# Patient Record
Sex: FEMALE | Race: WHITE | NOT HISPANIC OR LATINO | Employment: FULL TIME | URBAN - METROPOLITAN AREA
[De-identification: names, ages, dates, MRNs, and addresses within clinical notes are randomized per-mention and may not be internally consistent; named-entity substitution may affect disease eponyms.]

---

## 2017-05-10 ENCOUNTER — HISTORICAL (OUTPATIENT)
Dept: ADMINISTRATIVE | Facility: HOSPITAL | Age: 37
End: 2017-05-10

## 2017-05-10 LAB
ABS NEUT (OLG): 2.75 X10(3)/MCL (ref 2.1–9.2)
BASOPHILS # BLD AUTO: 0 X10(3)/MCL (ref 0–0.2)
BASOPHILS NFR BLD AUTO: 0 %
EOSINOPHIL # BLD AUTO: 0 X10(3)/MCL (ref 0–0.9)
EOSINOPHIL NFR BLD AUTO: 0 %
ERYTHROCYTE [DISTWIDTH] IN BLOOD BY AUTOMATED COUNT: 13.4 % (ref 11.5–17)
HBV SURFACE AG SERPL QL IA: NEGATIVE
HCT VFR BLD AUTO: 36.5 % (ref 37–47)
HGB BLD-MCNC: 11.9 GM/DL (ref 12–16)
HIV 1+2 AB+HIV1 P24 AG SERPL QL IA: NEGATIVE
LYMPHOCYTES # BLD AUTO: 2.6 X10(3)/MCL (ref 0.6–4.6)
LYMPHOCYTES NFR BLD AUTO: 45 %
MCH RBC QN AUTO: 30.7 PG (ref 27–31)
MCHC RBC AUTO-ENTMCNC: 32.6 GM/DL (ref 33–36)
MCV RBC AUTO: 94.1 FL (ref 80–94)
MONOCYTES # BLD AUTO: 0.3 X10(3)/MCL (ref 0.1–1.3)
MONOCYTES NFR BLD AUTO: 5 %
NEUTROPHILS # BLD AUTO: 2.75 X10(3)/MCL (ref 1.4–7.9)
NEUTROPHILS NFR BLD AUTO: 49 %
PLATELET # BLD AUTO: 149 X10(3)/MCL (ref 130–400)
PMV BLD AUTO: 11.2 FL (ref 9.4–12.4)
RBC # BLD AUTO: 3.88 X10(6)/MCL (ref 4.2–5.4)
RPR SER QL: NORMAL
WBC # SPEC AUTO: 5.6 X10(3)/MCL (ref 4.5–11.5)

## 2017-05-12 LAB — FINAL CULTURE: NO GROWTH

## 2017-10-03 ENCOUNTER — HISTORICAL (OUTPATIENT)
Dept: ADMINISTRATIVE | Facility: HOSPITAL | Age: 37
End: 2017-10-03

## 2017-10-03 LAB
ERYTHROCYTE [DISTWIDTH] IN BLOOD BY AUTOMATED COUNT: 13.8 % (ref 11.5–17)
GLUCOSE 1H P 100 G GLC PO SERPL-MCNC: 125 MG/DL (ref 100–180)
HCT VFR BLD AUTO: 35.4 % (ref 37–47)
HGB BLD-MCNC: 11.6 GM/DL (ref 12–16)
MCH RBC QN AUTO: 30.9 PG (ref 27–31)
MCHC RBC AUTO-ENTMCNC: 32.8 GM/DL (ref 33–36)
MCV RBC AUTO: 94.1 FL (ref 80–94)
PLATELET # BLD AUTO: 122 X10(3)/MCL (ref 130–400)
PMV BLD AUTO: 10.8 FL (ref 9.4–12.4)
PRODUCT READY: NORMAL
RBC # BLD AUTO: 3.76 X10(6)/MCL (ref 4.2–5.4)
WBC # SPEC AUTO: 6.9 X10(3)/MCL (ref 4.5–11.5)

## 2017-11-16 ENCOUNTER — HISTORICAL (OUTPATIENT)
Dept: LAB | Facility: HOSPITAL | Age: 37
End: 2017-11-16

## 2017-11-18 LAB — FINAL CULTURE: NORMAL

## 2022-04-10 ENCOUNTER — HISTORICAL (OUTPATIENT)
Dept: ADMINISTRATIVE | Facility: HOSPITAL | Age: 42
End: 2022-04-10

## 2022-04-26 VITALS
SYSTOLIC BLOOD PRESSURE: 113 MMHG | BODY MASS INDEX: 34.04 KG/M2 | DIASTOLIC BLOOD PRESSURE: 77 MMHG | WEIGHT: 147.06 LBS | OXYGEN SATURATION: 99 % | HEIGHT: 55 IN

## 2023-01-10 ENCOUNTER — HOSPITAL ENCOUNTER (OUTPATIENT)
Dept: RADIOLOGY | Facility: HOSPITAL | Age: 43
Discharge: HOME | End: 2023-01-10
Attending: STUDENT IN AN ORGANIZED HEALTH CARE EDUCATION/TRAINING PROGRAM
Payer: COMMERCIAL

## 2023-01-10 DIAGNOSIS — Z12.31 ENCOUNTER FOR SCREENING MAMMOGRAM FOR BREAST CANCER: ICD-10-CM

## 2023-01-10 PROCEDURE — 77063 BREAST TOMOSYNTHESIS BI: CPT | Mod: 26,,, | Performed by: RADIOLOGY

## 2023-01-10 PROCEDURE — 77067 MAMMO DIGITAL SCREENING BILAT WITH TOMO: ICD-10-PCS | Mod: 26,,, | Performed by: RADIOLOGY

## 2023-01-10 PROCEDURE — 77067 SCR MAMMO BI INCL CAD: CPT | Mod: 26,,, | Performed by: RADIOLOGY

## 2023-01-10 PROCEDURE — 77063 MAMMO DIGITAL SCREENING BILAT WITH TOMO: ICD-10-PCS | Mod: 26,,, | Performed by: RADIOLOGY

## 2023-01-10 PROCEDURE — 77067 SCR MAMMO BI INCL CAD: CPT | Mod: TC

## 2023-01-19 ENCOUNTER — HOSPITAL ENCOUNTER (OUTPATIENT)
Dept: RADIOLOGY | Facility: HOSPITAL | Age: 43
Discharge: HOME | End: 2023-01-19
Attending: STUDENT IN AN ORGANIZED HEALTH CARE EDUCATION/TRAINING PROGRAM
Payer: COMMERCIAL

## 2023-01-19 DIAGNOSIS — R92.8 ABNORMAL MAMMOGRAM: ICD-10-CM

## 2023-01-19 PROCEDURE — 77062 BREAST TOMOSYNTHESIS BI: CPT | Mod: 26,,, | Performed by: RADIOLOGY

## 2023-01-19 PROCEDURE — 77062 MAMMO DIGITAL DIAGNOSTIC BILAT WITH TOMO: ICD-10-PCS | Mod: 26,,, | Performed by: RADIOLOGY

## 2023-01-19 PROCEDURE — 77062 BREAST TOMOSYNTHESIS BI: CPT | Mod: TC

## 2023-01-19 PROCEDURE — 77066 MAMMO DIGITAL DIAGNOSTIC BILAT WITH TOMO: ICD-10-PCS | Mod: 26,,, | Performed by: RADIOLOGY

## 2023-01-19 PROCEDURE — 76642 ULTRASOUND BREAST LIMITED: CPT | Mod: 26,50,, | Performed by: RADIOLOGY

## 2023-01-19 PROCEDURE — 77066 DX MAMMO INCL CAD BI: CPT | Mod: 26,,, | Performed by: RADIOLOGY

## 2023-01-19 PROCEDURE — 76642 US BREAST BILATERAL LIMITED: ICD-10-PCS | Mod: 26,50,, | Performed by: RADIOLOGY

## 2023-01-19 PROCEDURE — 76642 ULTRASOUND BREAST LIMITED: CPT | Mod: TC,50

## 2023-02-09 ENCOUNTER — HOSPITAL ENCOUNTER (OUTPATIENT)
Dept: RADIOLOGY | Facility: HOSPITAL | Age: 43
Discharge: HOME | End: 2023-02-09
Attending: OBSTETRICS & GYNECOLOGY
Payer: COMMERCIAL

## 2023-02-09 ENCOUNTER — HOSPITAL ENCOUNTER (OUTPATIENT)
Dept: RADIOLOGY | Facility: HOSPITAL | Age: 43
Discharge: HOME | End: 2023-02-09
Attending: STUDENT IN AN ORGANIZED HEALTH CARE EDUCATION/TRAINING PROGRAM
Payer: COMMERCIAL

## 2023-02-09 DIAGNOSIS — R92.1 BREAST CALCIFICATIONS: ICD-10-CM

## 2023-02-09 DIAGNOSIS — R92.8 ABNORMAL MAMMOGRAM: Primary | ICD-10-CM

## 2023-02-09 PROCEDURE — 19082 PR BX BRST, EA ADD'L LESION, STEREOTACTIC GUIDANCE: ICD-10-PCS | Mod: RT,,, | Performed by: RADIOLOGY

## 2023-02-09 PROCEDURE — 27201044 MAMMO BREAST BIOPSY WITH IMAGING EA ADD SITE

## 2023-02-09 PROCEDURE — 19083 BX BREAST 1ST LESION US IMAG: CPT | Mod: 59,RT,, | Performed by: RADIOLOGY

## 2023-02-09 PROCEDURE — 77061 MAMMO DIGITAL DIAGNOSTIC RIGHT WITH TOMO: ICD-10-PCS | Mod: 26,RT,, | Performed by: RADIOLOGY

## 2023-02-09 PROCEDURE — 27201044 MAMMO BREAST STEREOTACTIC BIOPSY 1ST SITE COMPLETE

## 2023-02-09 PROCEDURE — 19081 MAMMO BREAST STEREOTACTIC BIOPSY 1ST SITE COMPLETE: ICD-10-PCS | Mod: RT,,, | Performed by: RADIOLOGY

## 2023-02-09 PROCEDURE — 19081 BX BREAST 1ST LESION STRTCTC: CPT | Mod: RT,,, | Performed by: RADIOLOGY

## 2023-02-09 PROCEDURE — 19083 BX BREAST 1ST LESION US IMAG: CPT | Mod: RT

## 2023-02-09 PROCEDURE — 19082 BX BREAST ADD LESION STRTCTC: CPT | Mod: RT,,, | Performed by: RADIOLOGY

## 2023-02-09 PROCEDURE — 77061 BREAST TOMOSYNTHESIS UNI: CPT | Mod: TC,RT

## 2023-02-09 PROCEDURE — 77065 DX MAMMO INCL CAD UNI: CPT | Mod: 26,RT,, | Performed by: RADIOLOGY

## 2023-02-09 PROCEDURE — 77061 BREAST TOMOSYNTHESIS UNI: CPT | Mod: 26,RT,, | Performed by: RADIOLOGY

## 2023-02-09 PROCEDURE — 19083 PR BX BRST, 1ST LESION, US GUIDANCE: ICD-10-PCS | Mod: 59,RT,, | Performed by: RADIOLOGY

## 2023-02-09 PROCEDURE — 77065 MAMMO DIGITAL DIAGNOSTIC RIGHT WITH TOMO: ICD-10-PCS | Mod: 26,RT,, | Performed by: RADIOLOGY

## 2023-02-09 NOTE — PROGRESS NOTES
Specimen #1 right breast 9:00 4cmfn mass  Specimen #2 right breast upper outer quadrant mid-depth amorphous calcifications    Left breast lateral mid-depth asymmetry

## 2023-02-15 DIAGNOSIS — Z91.89 AT HIGH RISK FOR BREAST CANCER: Primary | ICD-10-CM

## 2023-02-17 LAB — PSYCHE PATHOLOGY RESULT: NORMAL

## 2023-03-29 ENCOUNTER — HOSPITAL ENCOUNTER (EMERGENCY)
Facility: HOSPITAL | Age: 43
Discharge: HOME | End: 2023-03-29
Attending: EMERGENCY MEDICINE
Payer: COMMERCIAL

## 2023-03-29 VITALS
BODY MASS INDEX: 28 KG/M2 | HEIGHT: 63 IN | HEART RATE: 88 BPM | WEIGHT: 158 LBS | RESPIRATION RATE: 18 BRPM | SYSTOLIC BLOOD PRESSURE: 106 MMHG | DIASTOLIC BLOOD PRESSURE: 69 MMHG | TEMPERATURE: 98 F | OXYGEN SATURATION: 99 %

## 2023-03-29 DIAGNOSIS — R10.9 LEFT FLANK PAIN: Primary | ICD-10-CM

## 2023-03-29 DIAGNOSIS — S39.012A BACK STRAIN, INITIAL ENCOUNTER: ICD-10-CM

## 2023-03-29 LAB
ALBUMIN SERPL-MCNC: 4 G/DL (ref 3.5–5)
ALBUMIN/GLOB SERPL: 1.4 RATIO (ref 1.1–2)
ALP SERPL-CCNC: 52 UNIT/L (ref 40–150)
ALT SERPL-CCNC: 9 UNIT/L (ref 0–55)
APPEARANCE UR: CLEAR
AST SERPL-CCNC: 12 UNIT/L (ref 5–34)
B-HCG SERPL QL: NEGATIVE
BACTERIA #/AREA URNS AUTO: ABNORMAL /HPF
BASOPHILS # BLD AUTO: 0.02 X10(3)/MCL (ref 0–0.2)
BASOPHILS NFR BLD AUTO: 0.3 %
BILIRUB UR QL STRIP.AUTO: NEGATIVE MG/DL
BILIRUBIN DIRECT+TOT PNL SERPL-MCNC: 0.3 MG/DL
BUN SERPL-MCNC: 10.8 MG/DL (ref 7–18.7)
CALCIUM SERPL-MCNC: 9.1 MG/DL (ref 8.4–10.2)
CHLORIDE SERPL-SCNC: 108 MMOL/L (ref 98–107)
CO2 SERPL-SCNC: 26 MMOL/L (ref 22–29)
COLOR UR AUTO: YELLOW
CREAT SERPL-MCNC: 1.12 MG/DL (ref 0.55–1.02)
EOSINOPHIL # BLD AUTO: 0.11 X10(3)/MCL (ref 0–0.9)
EOSINOPHIL NFR BLD AUTO: 1.5 %
ERYTHROCYTE [DISTWIDTH] IN BLOOD BY AUTOMATED COUNT: 12.3 % (ref 11.5–17)
GFR SERPLBLD CREATININE-BSD FMLA CKD-EPI: >60 MLS/MIN/1.73/M2
GLOBULIN SER-MCNC: 2.8 GM/DL (ref 2.4–3.5)
GLUCOSE SERPL-MCNC: 99 MG/DL (ref 74–100)
GLUCOSE UR QL STRIP.AUTO: NEGATIVE MG/DL
HCT VFR BLD AUTO: 42 % (ref 37–47)
HGB BLD-MCNC: 14.1 G/DL (ref 12–16)
IMM GRANULOCYTES # BLD AUTO: 0.03 X10(3)/MCL (ref 0–0.04)
IMM GRANULOCYTES NFR BLD AUTO: 0.4 %
KETONES UR QL STRIP.AUTO: NEGATIVE MG/DL
LEUKOCYTE ESTERASE UR QL STRIP.AUTO: NEGATIVE UNIT/L
LIPASE SERPL-CCNC: 38 U/L
LYMPHOCYTES # BLD AUTO: 2.34 X10(3)/MCL (ref 0.6–4.6)
LYMPHOCYTES NFR BLD AUTO: 32.8 %
MCH RBC QN AUTO: 31.1 PG (ref 27–31)
MCHC RBC AUTO-ENTMCNC: 33.6 G/DL (ref 33–36)
MCV RBC AUTO: 92.5 FL (ref 80–94)
MONOCYTES # BLD AUTO: 0.42 X10(3)/MCL (ref 0.1–1.3)
MONOCYTES NFR BLD AUTO: 5.9 %
NEUTROPHILS # BLD AUTO: 4.21 X10(3)/MCL (ref 2.1–9.2)
NEUTROPHILS NFR BLD AUTO: 59.1 %
NITRITE UR QL STRIP.AUTO: NEGATIVE
NRBC BLD AUTO-RTO: 0 %
PH UR STRIP.AUTO: 5 [PH]
PLATELET # BLD AUTO: 160 X10(3)/MCL (ref 130–400)
PMV BLD AUTO: 11.9 FL (ref 7.4–10.4)
POTASSIUM SERPL-SCNC: 4 MMOL/L (ref 3.5–5.1)
PROT SERPL-MCNC: 6.8 GM/DL (ref 6.4–8.3)
PROT UR QL STRIP.AUTO: NEGATIVE MG/DL
RBC # BLD AUTO: 4.54 X10(6)/MCL (ref 4.2–5.4)
RBC #/AREA URNS AUTO: <5 /HPF
RBC UR QL AUTO: NEGATIVE UNIT/L
SODIUM SERPL-SCNC: 140 MMOL/L (ref 136–145)
SP GR UR STRIP.AUTO: 1.02 (ref 1–1.03)
SQUAMOUS #/AREA URNS AUTO: ABNORMAL /HPF
UROBILINOGEN UR STRIP-ACNC: 0.2 MG/DL
WBC # SPEC AUTO: 7.1 X10(3)/MCL (ref 4.5–11.5)
WBC #/AREA URNS AUTO: <5 /HPF
YEAST URNS QL MICRO: ABNORMAL /HPF

## 2023-03-29 PROCEDURE — 81025 URINE PREGNANCY TEST: CPT | Performed by: STUDENT IN AN ORGANIZED HEALTH CARE EDUCATION/TRAINING PROGRAM

## 2023-03-29 PROCEDURE — 63600175 PHARM REV CODE 636 W HCPCS: Performed by: EMERGENCY MEDICINE

## 2023-03-29 PROCEDURE — 85025 COMPLETE CBC W/AUTO DIFF WBC: CPT | Performed by: STUDENT IN AN ORGANIZED HEALTH CARE EDUCATION/TRAINING PROGRAM

## 2023-03-29 PROCEDURE — 99285 EMERGENCY DEPT VISIT HI MDM: CPT | Mod: 25

## 2023-03-29 PROCEDURE — 81001 URINALYSIS AUTO W/SCOPE: CPT | Performed by: STUDENT IN AN ORGANIZED HEALTH CARE EDUCATION/TRAINING PROGRAM

## 2023-03-29 PROCEDURE — 80053 COMPREHEN METABOLIC PANEL: CPT | Performed by: STUDENT IN AN ORGANIZED HEALTH CARE EDUCATION/TRAINING PROGRAM

## 2023-03-29 PROCEDURE — 96372 THER/PROPH/DIAG INJ SC/IM: CPT | Performed by: EMERGENCY MEDICINE

## 2023-03-29 PROCEDURE — 83690 ASSAY OF LIPASE: CPT | Performed by: STUDENT IN AN ORGANIZED HEALTH CARE EDUCATION/TRAINING PROGRAM

## 2023-03-29 RX ORDER — KETOROLAC TROMETHAMINE 30 MG/ML
30 INJECTION, SOLUTION INTRAMUSCULAR; INTRAVENOUS
Status: COMPLETED | OUTPATIENT
Start: 2023-03-29 | End: 2023-03-29

## 2023-03-29 RX ORDER — DICLOFENAC SODIUM 75 MG/1
75 TABLET, DELAYED RELEASE ORAL 2 TIMES DAILY
Qty: 30 TABLET | Refills: 0 | Status: SHIPPED | OUTPATIENT
Start: 2023-03-29

## 2023-03-29 RX ORDER — METHOCARBAMOL 750 MG/1
1500 TABLET, FILM COATED ORAL 3 TIMES DAILY
Qty: 42 TABLET | Refills: 0 | Status: SHIPPED | OUTPATIENT
Start: 2023-03-29 | End: 2023-04-05

## 2023-03-29 RX ADMIN — KETOROLAC TROMETHAMINE 30 MG: 30 INJECTION, SOLUTION INTRAMUSCULAR; INTRAVENOUS at 06:03

## 2023-03-29 NOTE — DISCHARGE INSTRUCTIONS
Follow-up with primary care physician within 1-2 weeks if not back to normal     Return to the ED if worsening pain or new symptoms such as fever, vomiting, blood in urine or stool, pain radiating into the leg, numbness or weakness, etc.

## 2023-03-29 NOTE — ED PROVIDER NOTES
"Encounter Date: 3/29/2023    SCRIBE #1 NOTE: I, Herman Stark, am scribing for, and in the presence of,  Dr. Sampson. I have scribed the following portions of the note - Other sections scribed: HPI, ROS, Physical Exam, MDM, Attending.     History     Chief Complaint   Patient presents with    Flank Pain     Bilat flank pain since Friday. Seen @ urgent care Monday and dx'd w/ UTI. Rx'd Cipro and pain has worsened accompanied by LUQ/LLQ abd pain. Denies painful urination     41 y/o CF presents to ED for back pain worsening since onset 5 days ago.  Pt says the pain radiates to her L flank and is worse when sitting or lying down.  She did not do any new activities prior to onset of the pain.  Pt was seen at walk in clinic 2 days ago and given antibiotics for "kidney infection."  She came here today because her pain continued to worsen.  She dneies fever, dysuria, hematuria, nausea, vomiting, diarrhea, blood in stool, cough, congestion or rhinorrhea.    The history is provided by the patient.   Flank Pain  This is a new problem. Episode onset: 5 days ago. The problem occurs constantly. The problem has been gradually worsening. Pertinent negatives include no chest pain, no abdominal pain, no headaches and no shortness of breath. Exacerbated by: sitting/lying down.   Review of patient's allergies indicates:  No Known Allergies  History reviewed. No pertinent past medical history.  History reviewed. No pertinent surgical history.  History reviewed. No pertinent family history.     Review of Systems   Constitutional:  Negative for fever.   HENT:  Negative for congestion, rhinorrhea and sore throat.    Eyes:  Negative for visual disturbance.   Respiratory:  Negative for cough and shortness of breath.    Cardiovascular:  Negative for chest pain.   Gastrointestinal:  Negative for abdominal pain, blood in stool, constipation, diarrhea, nausea and vomiting.   Genitourinary:  Positive for flank pain. Negative for dysuria and " hematuria.   Musculoskeletal:  Positive for back pain.   Skin:  Negative for rash.   Neurological:  Negative for syncope and headaches.   All other systems reviewed and are negative.    Physical Exam     Initial Vitals [03/29/23 0141]   BP Pulse Resp Temp SpO2   131/78 88 16 98.1 °F (36.7 °C) 99 %      MAP       --         Physical Exam    Nursing note and vitals reviewed.  Constitutional: No distress.   HENT:   Head: Normocephalic and atraumatic.   Right Ear: Tympanic membrane normal.   Left Ear: Tympanic membrane normal.   Mouth/Throat: Oropharynx is clear and moist.   Eyes: Conjunctivae and EOM are normal. Pupils are equal, round, and reactive to light.   Neck: Trachea normal. Neck supple. Carotid bruit is not present. No JVD present.   Normal range of motion.  Cardiovascular:  Normal rate and regular rhythm.           No murmur heard.  Pulmonary/Chest: Breath sounds normal. No respiratory distress. She exhibits no tenderness.   Abdominal: Abdomen is soft. Bowel sounds are normal. She exhibits no distension. There is no abdominal tenderness.   Musculoskeletal:         General: Tenderness (mild L mid lumbar paraspinous) present. Normal range of motion.      Cervical back: Normal range of motion and neck supple.      Lumbar back: Normal. Normal range of motion.      Comments: No midline or bony tenderness     Neurological: She is alert and oriented to person, place, and time. She has normal strength. No cranial nerve deficit or sensory deficit.   Slight discomfort of back with L straight leg raise   Psychiatric: She has a normal mood and affect.       ED Course   Procedures  Labs Reviewed   COMPREHENSIVE METABOLIC PANEL - Abnormal; Notable for the following components:       Result Value    Chloride 108 (*)     Creatinine 1.12 (*)     All other components within normal limits   CBC WITH DIFFERENTIAL - Abnormal; Notable for the following components:    MCH 31.1 (*)     MPV 11.9 (*)     All other components within  normal limits   URINALYSIS, MICROSCOPIC - Abnormal; Notable for the following components:    Squamous Epithelial Cells, UA 5-10 (*)     Yeast, UA Occasional (*)     All other components within normal limits   URINALYSIS, REFLEX TO URINE CULTURE - Normal   LIPASE - Normal   PREGNANCY TEST, URINE RAPID - Normal   CBC W/ AUTO DIFFERENTIAL    Narrative:     The following orders were created for panel order CBC auto differential.  Procedure                               Abnormality         Status                     ---------                               -----------         ------                     CBC with Differential[[id number]]        Abnormal            Final result                 Please view results for these tests on the individual orders.          Imaging Results              CT Abdomen Pelvis  Without Contrast (Final result)  Result time 03/29/23 06:47:00      Final result by Nadeem Skaggs MD (03/29/23 06:47:00)                   Impression:      Trace pelvic ascites is within physiologic limits.  Otherwise, no acute process identified.      Electronically signed by: Nadeem Skaggs  Date:    03/29/2023  Time:    06:47               Narrative:    EXAMINATION:  CT ABDOMEN PELVIS WITHOUT CONTRAST    CLINICAL HISTORY:  Flank pain, kidney stone suspected;    TECHNIQUE:  CT imaging of the abdomen and pelvis without intravenous contrast. Axial, coronal and sagittal reformatted images reviewed. Dose length product is 601 mGycm. Automatic exposure control, adjustment of mA/kV or iterative reconstruction technique used to limit radiation dose.    COMPARISON:  No relevant comparison studies available at the time of dictation.    FINDINGS:  Assessment of the visceral organs and vasculature is limited by the lack of IV contrast.    Liver/biliary: No concerning hepatic findings. No radiodense gallstone or biliary dilatation appreciated.    Pancreas: Normal.    Spleen: Normal.    Adrenals: Normal.    Genitourinary: No  hydronephrosis or defined urinary stone. Bladder under distended with no definitive abnormality.    Stomach/bowel: No bowel obstruction. Normal appendix. No discernible bowel inflammation.    Lymph nodes and peritoneum: No pathologically enlarged lymph node identified with noncontrast technique. Trace pelvic ascites.    Vasculature: Normal abdominal aortic caliber.    Abdominal wall: Normal.    Lung bases: No consolidation or pleural effusion.    Bones: No acute osseous findings.                                       Medications   ketorolac injection 30 mg (30 mg Intramuscular Given 3/29/23 0618)              Scribe Attestation:   Scribe #1: I performed the above scribed service and the documentation accurately describes the services I performed. I attest to the accuracy of the note.    Attending Attestation:           Physician Attestation for Scribe:  Physician Attestation Statement for Scribe #1: I, reviewed documentation, as scribed by Herman Stark in my presence, and it is both accurate and complete.         Medical Decision Making  Differential diagnoses include, but are not limited to: back strain, herniated disc, muscle spasm, pancreatitis, UTI, pyelonephritis, kidney stone, mass    Amount and/or Complexity of Data Reviewed  Labs: ordered. Decision-making details documented in ED Course.  Radiology: ordered. Decision-making details documented in ED Course.    Risk  Prescription drug management.  Risk Details: Patient with no concerning symptoms, specifically no concerning neurologic symptoms or findings.  She never really experienced any signs of a UTI or infectious etiology, with the exception of some mid left paraspinal lumbar pain.  She has no bony or midline pain, tenderness, swelling or redness on exam  Abdominal exam is benign, does have some reproducible pain with sitting straight leg raise on the left, consistent with musculoskeletal etiology.  Had a long conversation with the patient about her  laboratory tests findings, as well as CT findings.  I did express that at this point in time I am not sure if this is just spasms/strain versus problems because of potential herniated disc, bone spur causing radiculopathy.  She does not have radicular symptoms at this time, so will treat conservatively, with the understanding that she needs to follow up with PCP if not improving for possible physical therapy and then MRI fails to improve.  She also understands the need to return if worsening pain, and/or development of new symptoms, even in the context of a normal workup today.  Patient appears very reliable to return if worsening            ED Course as of 03/29/23 0726   Wed Mar 29, 2023   0716 Patient feeling better after Toradol [MP]      ED Course User Index  [MP] Norm Sampson MD                 Clinical Impression:   Final diagnoses:  [R10.9] Left flank pain (Primary)  [S39.012A] Back strain, initial encounter        ED Disposition Condition    Discharge Stable          ED Prescriptions       Medication Sig Dispense Start Date End Date Auth. Provider    methocarbamoL (ROBAXIN) 750 MG Tab Take 2 tablets (1,500 mg total) by mouth 3 (three) times daily. As needed for pain or spasms for 7 days 42 tablet 3/29/2023 4/5/2023 Norm Sampson MD    diclofenac (VOLTAREN) 75 MG EC tablet Take 1 tablet (75 mg total) by mouth 2 (two) times daily. 30 tablet 3/29/2023 -- Norm Sampson MD          Follow-up Information       Follow up With Specialties Details Why Contact Info    [facility] - Emergency Dept Emergency Medicine Go to  If symptoms worsen, As needed [id number] [facility] [id number] [phone]             Norm Sampson MD  03/29/23 8993

## 2023-04-18 ENCOUNTER — OFFICE VISIT (OUTPATIENT)
Dept: SURGERY | Facility: CLINIC | Age: 43
End: 2023-04-18
Payer: COMMERCIAL

## 2023-04-18 VITALS
OXYGEN SATURATION: 100 % | HEART RATE: 84 BPM | HEIGHT: 63 IN | TEMPERATURE: 98 F | BODY MASS INDEX: 28.77 KG/M2 | DIASTOLIC BLOOD PRESSURE: 76 MMHG | WEIGHT: 162.38 LBS | SYSTOLIC BLOOD PRESSURE: 107 MMHG | RESPIRATION RATE: 18 BRPM

## 2023-04-18 DIAGNOSIS — Z91.89 AT HIGH RISK FOR BREAST CANCER: Primary | ICD-10-CM

## 2023-04-18 DIAGNOSIS — Z80.3 FAMILY HISTORY OF BREAST CANCER: ICD-10-CM

## 2023-04-18 DIAGNOSIS — Z12.31 SCREENING MAMMOGRAM FOR BREAST CANCER: ICD-10-CM

## 2023-04-18 PROCEDURE — 99999 PR PBB SHADOW E&M-EST. PATIENT-LVL V: CPT | Mod: PBBFAC,,,

## 2023-04-18 PROCEDURE — 99999 PR PBB SHADOW E&M-EST. PATIENT-LVL V: ICD-10-PCS | Mod: PBBFAC,,,

## 2023-04-18 PROCEDURE — 99205 PR OFFICE/OUTPT VISIT, NEW, LEVL V, 60-74 MIN: ICD-10-PCS | Mod: S$GLB,,, | Performed by: NURSE PRACTITIONER

## 2023-04-18 PROCEDURE — 99205 OFFICE O/P NEW HI 60 MIN: CPT | Mod: S$GLB,,, | Performed by: NURSE PRACTITIONER

## 2023-04-18 NOTE — PROGRESS NOTES
[facility] - Breast [place] [facility]  Breast Surgical Oncology  New Patient Office Visit - H&P      Referring Provider:  Dr. Vitaly Renae     PCP: Networked reference to record PCT      Care Team: Patient Care Team:  Vitaly Renae MD as PCP - General (Obstetrics and Gynecology)    Chief Complaint:   Chief Complaint   Patient presents with    High Risk Breast Assessment     New High Risk Patient; TC score 37.6; Patient had Breast Biopsy done ; Patient c/o discomfort in left breast from Biopsy.      Subjective:      History of Present Illness:  Rajesh Navarro  is a pleasant female patient who initially presents on  2023 at 42 y.o. years old for evaluation and assessment of risk for breast cancer based on  the Tyrer - Cuzick Breast Cancer Risk Model (> 20% indicates elevated lifetime risk). Her lifetime risk is calculated to be TC V8 44.5%. Kathy 5 year 2.8%. Mother diagnosed with Breast CA at age 66. Genetic testing for mother returned negative. No paternal family history of cancer.     Patient continues to breast feed every 2-3 days, mostly with left breast. Patient is a . [facility]. Denies any breast issues including rashes, redness, pain, swelling, nipple discharge, or new lumps/masses. Performs self breast exams monthly.    Imagin23 Screening MG Incomplete.    23 BL Diagnostic MG with US: Breast Heterogeneously dense. Asymmetry in the lateral left breast, middle depth, measuring up to 40 mm is suspicious.  Left breast tomosynthesis guided core needle biopsy is recommended. Amorphous calcifications in a regional distribution spanning 24 mm in the upper-outer quadrant of the right breast, middle to posterior depth, are suspicious.  Right breast stereotactic guided core needle biopsy is recommended. Oval, hypoechoic mass with indistinct margins measuring up to 6 mm in the 9:00 right breast, 4 cm from the nipple, is suspicious.  Right  breast ultrasound-guided core needle biopsy is recommended.      02/1/23 Tomosynthesis/stereotactic guided biopsy of the asymmetry in the lateral left breast, middle depth, with placement of a clip was successful with no apparent post procedure complications.  Pathology indicates focal sclerosing adenosis. Pathology results are benign and are concordant with the imaging findings.      Pathology:   02/01/23 1. RIGHT BREAST AT 9 O'CLOCK 4 CMFN, BIOPSIES:   FRAGMENT OF REACTIVE LYMPH NODE. NO NEOPLASM.   2. RIGHT BREAST, UPPER OUTER QUADRANT MID DEPTH AMORPHOUS CALCIFICATIONS,   BIOPSIES:     A) FIBROCYSTIC CHANGE WITH CALCIFICATION.     B) FIBROADENOMATOID CHANGE.     C) EPITHELIAL HYPERPLASIA OF THE USUAL TYPE, MILD.     D) FOCAL PSEUDOANGIOMATOUS STROMAL HYPERPLASIA.     E) FOCAL SCLEROSING ADENOSIS.   3. LEFT BREAST, LATERAL MID DEPTH ASYMMETRY, BIOPSIES:     FOCAL SCLEROSING ADENOSIS. NO ATYPIA.     OB / GYN History   Menarche Onset:14  Menopause: Menopause: premenopausal  Hormonal birth control (duration): 4years  Pregnancies: 3  Age at first child birth: 25  Child births: 3  Hysterectomy/Oophorectomy: NA  HRT: no    Family History of Cancer (& age at diagnosis):  Family History   Problem Relation Age of Onset    Breast cancer Mother 67    Hypertension Mother     Thyroid disease Mother     Hypertension Father     Breast cancer Maternal Grandmother 55    Heart failure Maternal Grandfather     Breast cancer Maternal Aunt 67    Rashes / Skin problems Maternal Aunt     Pancreatic cancer Maternal Aunt 71    Diabetes Maternal Aunt     Stroke Maternal Aunt     Heart failure Paternal Aunt     Diabetes Paternal Uncle         Lifestyle:  Height and Weight: 5'3'' & 162 lbs  BMI: Body mass index is 28.77 kg/m².     Other:  # of breast biopsies (when and pathology results): 3  MG breast density: No breast composition recorded.   Prior thoracic RT: none  Genetic testing: Mother tested negative, No paternal family history of  [facility] ordered genetic testing.   Ashkenazi [employer] descent: No    Other History:     Past Medical History:   Diagnosis Date    Anemia     When i was 13 or so    Unspecified viral hepatitis B without hepatic coma         Past Surgical History:   Procedure Laterality Date    BREAST BIOPSY          Social History     Socioeconomic History    Marital status:    Tobacco Use    Smoking status: Former     Packs/day: 1.00     Years: 10.00     Pack years: 10.00     Types: Cigarettes, Vaping with nicotine    Smokeless tobacco: Never    Tobacco comments:     Quit since 2014. Smoking off and on   Substance and Sexual Activity    Alcohol use: Yes     Alcohol/week: 1.0 standard drink     Types: 1 Glasses of wine per week    Drug use: Never    Sexual activity: Yes     Partners: Male     Birth control/protection: None        There is no immunization history on file for this patient.     Medications/Allergies:     Current Outpatient Medications:     diclofenac (VOLTAREN) 75 MG EC tablet, Take 1 tablet (75 mg total) by mouth 2 (two) times daily. (Patient not taking: Reported on 4/18/2023), Disp: 30 tablet, Rfl: 0    Review of patient's allergies indicates:  No Known Allergies     Review of Systems:    Pertinent positives documented in history.     Objective/Physical Exam     Vitals:    04/18/23 0911   BP: 107/76   Pulse: 84   Resp: 18   Temp: 98.2 °F (36.8 °C)     General: The patient is awake, alert and oriented times three. The patient is well nourished and in no acute distress.  Neck: There is no evidence of palpable cervical, supraclavicular or axillary adenopathy. The neck is supple. The thyroid is not enlarged.  Musculoskeletal: The patient has a normal range of motion of her bilateral upper extremities.  Chest: Examination of the chest wall fails to reveal any obvious abnormalities. Nonlabored breathing, symmetric expansion.  Breast:  Right: Examination of right breast fails to reveal any dominant masses or areas of  significant focal nodularity. The nipple is everted without evidence of discharge. There is no skin dimpling with movement of the pectoralis. There are no significant skin changes overlying the breast.   Left: Examination of the left breast fails to reveal any dominant masses or areas of significant focal nodularity. The nipple is everted without evidence of discharge. There is no skin dimpling with movement of the pectoralis. There are no significant skin changes overlying the breast.  Abdomen: The abdomen is soft, flat, nontender and nondistended.  Integumentary: no rashes or skin lesions present  Neurologic: cranial nerves intact, no signs of peripheral neurological deficit, motor/sensory function intact     Assessment and Plan     There is no problem list on file for this patient.    Rajesh was seen today for high risk breast assessment.    Diagnoses and all orders for this visit:    At high risk for breast cancer  -     Ambulatory referral/consult to Breast Surgery  -     MRI Breast w/wo Contrast, w/CAD, Bilateral; Future  -     Mammo Digital Screening Bilat; Future    Family history of breast cancer  -     MRI Breast w/wo Contrast, w/CAD, Bilateral; Future  -     Mammo Digital Screening Bilat; Future    Screening mammogram for breast cancer  -     Mammo Digital Screening Bilat; Future      --------------------------------------------------------------------------------------------------------------  After the initial clinical evaluation nearly 30 minutes were on counseling the patients regarding the options for management. Risk reduction strategies were discussed.     1. Lifestyle factors: As with other types of cancer, studies continue to show that various lifestyle factors may contribute to the development of breast cancer.     Weight: Recent studies have shown that postmenopausal women who are overweight or obese have an increased risk of breast cancer. These women also have a higher risk of having the  "cancer come back after treatment.     Physical activity: Decreased physical activity is associated with an increased risk of developing breast cancer and a higher risk of having the cancer come back after treatment. Regular physical activity may protect against breast cancer by helping women maintain a healthy body weight, lowering hormone levels, or causing changes in a womens metabolism or immune factors.     Alcohol: Current research suggests that having more than 1 to 2 alcoholic drinks, including beer, wine, and spirits, per day raises the risk of breast cancer, as well as the risk of having the cancer come back after treatment.     Food: There is no reliable research that confirms that eating or avoiding specific foods reduces the risk of developing breast cancer or having the cancer come back after treatment. However, eating more fruits and vegetables and fewer animal fats is linked with many health benefits.     2. Prevention:  Surgery to lower cancer risk: For women with BRCA1 or BRCA2 genetic mutations, which substantially increase the risk of breast cancer, preventive removal of the breasts may be considered. The procedure, called a prophylactic mastectomy, appears to reduce the risk of developing breast cancer by at least 95%. Women with these mutations should also consider the preventive removal of the ovaries and fallopian tubes, called a prophylactic salpingo-oophorectomy. This procedure can reduce the risk of developing ovarian cancer, as well as breast cancer, by stopping the ovaries from making estrogen.      Drugs to lower cancer risk (Chemoprevention): Women who have a higher than usual risk of developing breast cancer may consider certain drugs that may help prevent breast cancer. This approach may also be called "chemoprevention." For breast cancer, this is the use of hormone-blocking drugs to reduce cancer risk. The drugs, tamoxifen (Soltamox) and raloxifene (Evista), are approved by the [place] " Food and Drug Administration (FDA) to lower breast cancer risk. These drugs are called selective estrogen receptor modulators (SERMs) and are not chemotherapy. A SERM is a medication that blocks estrogen receptors in some tissues and not others. Both women who have gone through menopause and those who have not may take tamoxifen. Raloxifene is only approved for women who have gone through menopause. Each drug also has different side effects.     Aromatase inhibitors (AIs) have also been shown to lower breast cancer risk. AIs are a type of hormone-blocking treatment that reduces the amount of estrogen in a woman's body by stopping tissues and organs other than the ovaries from producing estrogen. They can only be used by women who have gone through menopause. However, no AIs have been approved by the FDA for lowering breast cancer risk in women who do not have the disease.     3. Surveillance: Women at greater than 20 percent average lifetime risk of invasive breast cancer based mainly on family history     Clinical Breast exam: Every 6-12 months starting at age found to be at increased risk by risk model     Mammogram: Every year starting 10 years younger than the youngest breast cancer case in the family (but not before age 30)     Breast MRI: Every year starting 10 years younger than the youngest breast cancer case in the family (but not before age 25). If you have a first-degree relative with a BRCA1/2 gene mutation, youre encouraged to get genetic counseling and/or testing before getting MRI as part of screening (for those who do not wish to have genetic testing, MRI is recommended). Breast MRI in combination with mammography is better than mammography alone at finding breast cancer in certain women at higher than average risk.    --------------------------------------------------------------------------------------------------------------    PLAN:    1. Lifestyle - Healthy lifestyle guidelines were reviewed.  She was encouraged to engage in regular exercise, maintain a healthy body weight, and avoid excessive alcohol consumption. Healthy nutritional guidelines were also discussed. Self-breast examination was reviewed with the patient in detail and she was encouraged to perform this on a monthly basis.    2. Surveillance - She desires undergoing high risk screening with annual screening mammograms and breast MRIs. In the absence of significant clinical findings in the interval, I recommend MRI mid July 2023 followed by annual MG screening mid Jan 2024. If patient unable to afford MRI we will add BL US to MG.     3. Prevention - We had a brief discussion/education about indications for preventative mastectomy or chemoprevention.  These methods are not recommended to her at this time. Kathy 5 year < 3%    4. Genetics - Mother tested negative. No paternal family history. However [facility] genetic [facility]. We will obtain results when made available.     RTC following MRI in July. At that time we will be on ~6 month rotation with her GYN.      All of her questions were answered.     Clyde SARGENT  --------------------------------------------------------------------------------------------------------------  --------------------------------------------------------------------------------------------------------------  Total time on the date of the visit ranged from 60-74 mins (05005). Total time includes both face-to-face and non-face-to-face time personally spent by myself on the day of the visit.    Non-face-to-face time included:  _X_ preparing to see the patient such as reviewing the patient record  _X_ obtaining and reviewing separately obtained history  _X_ independently interpreting results  _X_ documenting clinical information in electronic health record.    Face-to-face time included:  _X_ performing an appropriate history and examination  _X_ communicating results to the patient  _X_ counseling and educating the  patient  __ ordering appropriate medications  _x_ ordering appropriate tests  _X_ ordering appropriate procedures (including follow-up)  _X_ answering any questions the patient had    Total Time spent on date of visit: 60 minutes

## 2023-06-21 ENCOUNTER — APPOINTMENT (OUTPATIENT)
Dept: RADIOLOGY | Facility: HOSPITAL | Age: 43
End: 2023-06-21
Attending: NURSE PRACTITIONER
Payer: COMMERCIAL

## 2023-06-21 DIAGNOSIS — Z91.89 AT HIGH RISK FOR BREAST CANCER: ICD-10-CM

## 2023-06-21 DIAGNOSIS — Z80.3 FAMILY HISTORY OF BREAST CANCER: ICD-10-CM

## 2023-06-21 PROCEDURE — 25500020 PHARM REV CODE 255: Performed by: NURSE PRACTITIONER

## 2023-06-21 PROCEDURE — A9577 INJ MULTIHANCE: HCPCS | Performed by: NURSE PRACTITIONER

## 2023-06-21 PROCEDURE — 77049 MRI BREAST W/WO CONTRAST, W/CAD, BILATERAL: ICD-10-PCS | Mod: 26,,, | Performed by: STUDENT IN AN ORGANIZED HEALTH CARE EDUCATION/TRAINING PROGRAM

## 2023-06-21 PROCEDURE — 77049 MRI BREAST C-+ W/CAD BI: CPT | Mod: TC

## 2023-06-21 PROCEDURE — 77049 MRI BREAST C-+ W/CAD BI: CPT | Mod: 26,,, | Performed by: STUDENT IN AN ORGANIZED HEALTH CARE EDUCATION/TRAINING PROGRAM

## 2023-06-21 RX ADMIN — GADOBENATE DIMEGLUMINE 15 ML: 529 INJECTION, SOLUTION INTRAVENOUS at 12:06

## 2023-08-29 ENCOUNTER — OFFICE VISIT (OUTPATIENT)
Dept: SURGERY | Facility: CLINIC | Age: 43
End: 2023-08-29
Payer: COMMERCIAL

## 2023-08-29 VITALS
BODY MASS INDEX: 28.38 KG/M2 | SYSTOLIC BLOOD PRESSURE: 132 MMHG | TEMPERATURE: 98 F | OXYGEN SATURATION: 100 % | HEIGHT: 63 IN | RESPIRATION RATE: 18 BRPM | DIASTOLIC BLOOD PRESSURE: 91 MMHG | WEIGHT: 160.19 LBS | HEART RATE: 99 BPM

## 2023-08-29 DIAGNOSIS — Z91.89 AT HIGH RISK FOR BREAST CANCER: Primary | ICD-10-CM

## 2023-08-29 DIAGNOSIS — Z12.31 SCREENING MAMMOGRAM FOR BREAST CANCER: ICD-10-CM

## 2023-08-29 DIAGNOSIS — Z80.3 FAMILY HISTORY OF BREAST CANCER: ICD-10-CM

## 2023-08-29 PROCEDURE — 99214 OFFICE O/P EST MOD 30 MIN: CPT | Mod: S$GLB,,, | Performed by: NURSE PRACTITIONER

## 2023-08-29 PROCEDURE — 99999 PR PBB SHADOW E&M-EST. PATIENT-LVL IV: CPT | Mod: PBBFAC,,, | Performed by: NURSE PRACTITIONER

## 2023-08-29 PROCEDURE — 99999 PR PBB SHADOW E&M-EST. PATIENT-LVL IV: ICD-10-PCS | Mod: PBBFAC,,, | Performed by: NURSE PRACTITIONER

## 2023-08-29 PROCEDURE — 99214 PR OFFICE/OUTPT VISIT, EST, LEVL IV, 30-39 MIN: ICD-10-PCS | Mod: S$GLB,,, | Performed by: NURSE PRACTITIONER

## 2023-08-29 RX ORDER — DEXTROAMPHETAMINE SACCHARATE, AMPHETAMINE ASPARTATE MONOHYDRATE, DEXTROAMPHETAMINE SULFATE AND AMPHETAMINE SULFATE 7.5; 7.5; 7.5; 7.5 MG/1; MG/1; MG/1; MG/1
30 CAPSULE, EXTENDED RELEASE ORAL
COMMUNITY

## 2023-08-29 NOTE — PROGRESS NOTES
[facility] [place] [facility]  Breast Surgical Oncology  Follow up Visit - H&P      Referring Provider:  No ref. provider found     PCP: Networked reference to record PCT      Care Team: Patient Care Team:  Vitaly Renae MD as PCP - General (Obstetrics and Gynecology)    Chief Complaint:   Chief Complaint   Patient presents with    Follow-up     Patient is doing well      Subjective:      Interval History: Patient here for follow up. Reviewed with patient recent imaging results which were negative. Denies any breast issues including rashes, redness, pain, swelling, nipple discharge, or new lumps/masses. Performs self breast exams monthly. She has been feeling well. She has completely discontinued breast feeding. Recently patient resumed Adderall.      History of Present Illness:  Rajesh Navarro  is a pleasant female patient who initially presents on  2023 at 43 y.o. years old for evaluation and assessment of risk for breast cancer based on  the Tyrer - Cuzick Breast Cancer Risk Model (> 20% indicates elevated lifetime risk). Her lifetime risk is calculated to be TC V8 44.5%. Kathy 5 year 2.8%. Mother diagnosed with Breast CA at age 66. Genetic testing for mother returned negative. No paternal family history of cancer.     Patient continues to breast feed every 2-3 days, mostly with left breast. Patient is a . [facility]. Denies any breast issues including rashes, redness, pain, swelling, nipple discharge, or new lumps/masses. Performs self breast exams monthly.    Imagin23 BL Diagnostic MG with US: Breast Heterogeneously dense. Asymmetry in the lateral left breast, middle depth, measuring up to 40 mm is suspicious.  Left breast tomosynthesis guided core needle biopsy is recommended. Amorphous calcifications in a regional distribution spanning 24 mm in the upper-outer quadrant of the right breast, middle to posterior depth, are suspicious.   Right breast stereotactic guided core needle biopsy is recommended. Oval, hypoechoic mass with indistinct margins measuring up to 6 mm in the 9:00 right breast, 4 cm from the nipple, is suspicious.  Right breast ultrasound-guided core needle biopsy is recommended.      02/1/23 Tomosynthesis/stereotactic guided biopsy of the asymmetry in the lateral left breast, middle depth, with placement of a clip was successful with no apparent post procedure complications.  Pathology indicates focal sclerosing adenosis. Pathology results are benign and are concordant with the imaging findings.      6/21/2023 Breast MRI: No MRI evidence of malignancy in either breast. No abnormal enhancement at the benign core needle biopsy sites in both breasts. Cystic mastopathy of both breasts is benign.  BI-RADS 2: Benign.      Pathology:   02/01/23 1. RIGHT BREAST AT 9 O'CLOCK 4 CMFN, BIOPSIES:   FRAGMENT OF REACTIVE LYMPH NODE. NO NEOPLASM.   2. RIGHT BREAST, UPPER OUTER QUADRANT MID DEPTH AMORPHOUS CALCIFICATIONS,   BIOPSIES:     A) FIBROCYSTIC CHANGE WITH CALCIFICATION.     B) FIBROADENOMATOID CHANGE.     C) EPITHELIAL HYPERPLASIA OF THE USUAL TYPE, MILD.     D) FOCAL PSEUDOANGIOMATOUS STROMAL HYPERPLASIA.     E) FOCAL SCLEROSING ADENOSIS.   3. LEFT BREAST, LATERAL MID DEPTH ASYMMETRY, BIOPSIES:     FOCAL SCLEROSING ADENOSIS. NO ATYPIA.     OB / GYN History   Menarche Onset:14  Menopause: Menopause: premenopausal  Hormonal birth control (duration): 4years  Pregnancies: 3  Age at first child birth: 25  Child births: 3  Hysterectomy/Oophorectomy: NA  HRT: no    Family History of Cancer (& age at diagnosis):  Family History   Problem Relation Age of Onset    Breast cancer Mother 67    Hypertension Mother     Thyroid disease Mother     Hypertension Father     Breast cancer Maternal Grandmother 55    Heart failure Maternal Grandfather     Breast cancer Maternal Aunt 67    Rashes / Skin problems Maternal Aunt     Pancreatic cancer Maternal Aunt  71    Diabetes Maternal Aunt     Stroke Maternal Aunt     Heart failure Paternal Aunt     Diabetes Paternal Uncle         Lifestyle:  Height and Weight: 5'3'' & 162 lbs  BMI: Body mass index is 28.38 kg/m².     Other:  # of breast biopsies (when and pathology results): 3  MG breast density: No breast composition recorded.   Prior thoracic RT: none  Genetic testing: Mother tested negative, No paternal family history of Ca. [facility].   Ashkenazi Mandaen descent: No    Other History:     Past Medical History:   Diagnosis Date    Anemia     When i was 13 or so    Unspecified viral hepatitis B without hepatic coma         Past Surgical History:   Procedure Laterality Date    BREAST BIOPSY          Social History     Socioeconomic History    Marital status:    Tobacco Use    Smoking status: Former     Current packs/day: 1.00     Average packs/day: 1 pack/day for 10.0 years (10.0 ttl pk-yrs)     Types: Cigarettes, Vaping with nicotine    Smokeless tobacco: Never    Tobacco comments:     Quit since 2014. Smoking off and on   Substance and Sexual Activity    Alcohol use: Yes     Alcohol/week: 1.0 standard drink of alcohol     Types: 1 Glasses of wine per week    Drug use: Never    Sexual activity: Yes     Partners: Male     Birth control/protection: None        There is no immunization history on file for this patient.     Medications/Allergies:     Current Outpatient Medications:     dextroamphetamine-amphetamine (ADDERALL XR) 30 MG 24 hr capsule, Take 30 mg by mouth., Disp: , Rfl:     diclofenac (VOLTAREN) 75 MG EC tablet, Take 1 tablet (75 mg total) by mouth 2 (two) times daily. (Patient not taking: Reported on 4/18/2023), Disp: 30 tablet, Rfl: 0    Review of patient's allergies indicates:  No Known Allergies     Review of Systems:    Pertinent positives documented in history.     Objective/Physical Exam     Vitals:    08/29/23 0852   BP: (!) 132/91   Pulse: 99   Resp: 18   Temp: 97.5 °F (36.4 °C)      General: The patient is awake, alert and oriented times three. The patient is well nourished and in no acute distress.  Neck: There is no evidence of palpable cervical, supraclavicular or axillary adenopathy. The neck is supple. The thyroid is not enlarged.  Musculoskeletal: The patient has a normal range of motion of her bilateral upper extremities.  Chest: Examination of the chest wall fails to reveal any obvious abnormalities. Nonlabored breathing, symmetric expansion.  Breast:  Right: Examination of right breast fails to reveal any dominant masses or areas of significant focal nodularity. The nipple is everted without evidence of discharge. There is no skin dimpling with movement of the pectoralis. There are no significant skin changes overlying the breast.   Left: Examination of the left breast fails to reveal any dominant masses or areas of significant focal nodularity. The nipple is everted without evidence of discharge. There is no skin dimpling with movement of the pectoralis. There are no significant skin changes overlying the breast.  Abdomen: The abdomen is soft, flat, nontender and nondistended.  Integumentary: no rashes or skin lesions present  Neurologic: cranial nerves intact, no signs of peripheral neurological deficit, motor/sensory function intact     Assessment and Plan     There is no problem list on file for this patient.    Rajesh was seen today for follow-up.    Diagnoses and all orders for this visit:    At high risk for breast cancer    Family history of breast cancer    Screening mammogram for breast cancer        PLAN:    1. Lifestyle - Healthy lifestyle guidelines were reviewed. She was encouraged to engage in regular exercise, maintain a healthy body weight, and avoid excessive alcohol consumption. Healthy nutritional guidelines were also discussed. Self-breast examination was reviewed with the patient in detail and she was encouraged to perform this on a monthly basis.    2.  Surveillance - She desires undergoing high risk screening with annual screening mammograms and breast MRIs. In the absence of significant clinical findings in the interval, I recommend...  Annual MG screening mid Jan 2024.   High Risk Breast MRI mid July 2024     3. Genetics - Mother tested negative. No paternal family history. However [facility] genetic [facility]. We will obtain results when made available.     4. Her BP was elevated during today's visit. She is asymptomatic (denies headache, lightheadedness, SOB, or CP). She contributes the BP readings anxiety about today's appointment.She also recently resumed Adderall. She was advised to follow up with her PCP soon regarding her elevated blood pressure and report to the ED if she develops headache, lightheadedness, sob, or chest pain.  MA to contact patient for at home BP reading.    RTC following MRI in July. At that time we will be on ~6 month rotation with her GYN.      All of her questions were answered.     Clyde SARGENT  --------------------------------------------------------------------------------------------------------------

## 2024-02-02 ENCOUNTER — HOSPITAL ENCOUNTER (OUTPATIENT)
Dept: RADIOLOGY | Facility: HOSPITAL | Age: 44
Discharge: HOME | End: 2024-02-02
Attending: NURSE PRACTITIONER
Payer: COMMERCIAL

## 2024-02-02 DIAGNOSIS — Z12.31 SCREENING MAMMOGRAM FOR BREAST CANCER: ICD-10-CM

## 2024-02-02 DIAGNOSIS — Z80.3 FAMILY HISTORY OF BREAST CANCER: ICD-10-CM

## 2024-02-02 DIAGNOSIS — Z91.89 AT HIGH RISK FOR BREAST CANCER: ICD-10-CM

## 2024-03-11 ENCOUNTER — HOSPITAL ENCOUNTER (OUTPATIENT)
Dept: RADIOLOGY | Facility: HOSPITAL | Age: 44
Discharge: HOME | End: 2024-03-11
Attending: NURSE PRACTITIONER
Payer: COMMERCIAL

## 2024-03-11 DIAGNOSIS — N63.0 LUMP OR MASS IN BREAST: ICD-10-CM

## 2024-03-11 PROCEDURE — 77062 BREAST TOMOSYNTHESIS BI: CPT | Mod: TC

## 2024-03-11 PROCEDURE — 76642 ULTRASOUND BREAST LIMITED: CPT | Mod: TC,LT

## 2024-03-11 PROCEDURE — 76642 ULTRASOUND BREAST LIMITED: CPT | Mod: 26,LT,, | Performed by: RADIOLOGY

## 2024-03-11 PROCEDURE — 77062 BREAST TOMOSYNTHESIS BI: CPT | Mod: 26,,, | Performed by: RADIOLOGY

## 2024-03-11 PROCEDURE — 77066 DX MAMMO INCL CAD BI: CPT | Mod: 26,,, | Performed by: RADIOLOGY

## 2024-06-27 DIAGNOSIS — Z12.31 ENCOUNTER FOR SCREENING MAMMOGRAM FOR MALIGNANT NEOPLASM OF BREAST: Primary | ICD-10-CM

## 2024-07-20 NOTE — PROGRESS NOTES
[facility] [facility]  Breast Surgical Oncology  Follow up Visit - H&P      Referring Provider:  No ref. provider found     PCP: Networked reference to record PCT      Care Team: Patient Care Team:  Vitaly Renae MD as PCP - General (Obstetrics and Gynecology)  Keiko Gomes PA-C as Physician Assistant (General Surgery)  Rachell Blackwood MD (Hospitalist)    Chief Complaint:   Chief Complaint   Patient presents with    Follow-up     Patient reports no new breast concerns      Subjective:      Interval History: 07/22/2024 Patient here for high risk follow up. She is doing well today. She recently underwent BL DG MG and L US in March due to concerns of left breast lump that had been fluctuating in size for 2-3 months. Imaging resulted as benign. Patient states the lump that was previously imaged is still palpable on self-breast exam.  She states the lump fluctuates in size around the time of her cycle, and it is tender to palpation. She locates the lump in the subareolar region of her left breast. She currently denies any other breast issues including rashes, redness, swelling, or nipple discharge. She performs self breast exams monthly. Patient states since our last visit she did have a paternal aunt who was diagnosed with lung cancer. She denies any significant interval changes or any other changes to family history. She has no other questions or concerns today.     History of Present Illness:  Rajesh Navarro  is a pleasant female patient who initially presents on  07/22/2024 at 43 y.o. years old for evaluation and assessment of risk for breast cancer based on  the Tyrer - Cuzick Breast Cancer Risk Model (> 20% indicates elevated lifetime risk). Her lifetime risk is calculated to be TC V8 44.4%. Kathy 5 year 3.1% (updated 7/22/2024). Mother diagnosed with Breast CA at age 66. Genetic testing for mother returned negative. No paternal family history of cancer.     Patient  is a . [facility]. Denies any breast issues including rashes, redness, pain, swelling, nipple discharge, or new lumps/masses. Performs self breast exams monthly.    Imagin23 BL Diagnostic MG with US: Breast Heterogeneously dense. Asymmetry in the lateral left breast, middle depth, measuring up to 40 mm is suspicious.  Left breast tomosynthesis guided core needle biopsy is recommended. Amorphous calcifications in a regional distribution spanning 24 mm in the upper-outer quadrant of the right breast, middle to posterior depth, are suspicious.  Right breast stereotactic guided core needle biopsy is recommended. Oval, hypoechoic mass with indistinct margins measuring up to 6 mm in the 9:00 right breast, 4 cm from the nipple, is suspicious.  Right breast ultrasound-guided core needle biopsy is recommended.      23 Tomosynthesis/stereotactic guided biopsy of the asymmetry in the lateral left breast, middle depth, with placement of a clip was successful with no apparent post procedure complications.  Pathology indicates focal sclerosing adenosis. Pathology results are benign and are concordant with the imaging findings.      2023 Breast MRI: No MRI evidence of malignancy in either breast. No abnormal enhancement at the benign core needle biopsy sites in both breasts. Cystic mastopathy of both breasts is benign.  BI-RADS 2: Benign.      3/11//2024 DG MG and L US - No mammographic evidence of malignancy is seen involving either breast.  No sonographic evidence of malignancy is seen involving the 3:00 subareolar left breast.No suspicious mammographic or sonographic finding in the 3:00 subareolar left breast, in the area of palpable concern.  Benign fibroglandular tissue is seen in this region.  The patient was reassured and encouraged to follow up with her primary care provider.    Pathology:   23 1. RIGHT BREAST AT 9 O'CLOCK 4 CMFN, BIOPSIES:   FRAGMENT OF REACTIVE LYMPH  NODE. NO NEOPLASM.   2. RIGHT BREAST, UPPER OUTER QUADRANT MID DEPTH AMORPHOUS CALCIFICATIONS,   BIOPSIES:     A) FIBROCYSTIC CHANGE WITH CALCIFICATION.     B) FIBROADENOMATOID CHANGE.     C) EPITHELIAL HYPERPLASIA OF THE USUAL TYPE, MILD.     D) FOCAL PSEUDOANGIOMATOUS STROMAL HYPERPLASIA.     E) FOCAL SCLEROSING ADENOSIS.   3. LEFT BREAST, LATERAL MID DEPTH ASYMMETRY, BIOPSIES:     FOCAL SCLEROSING ADENOSIS. NO ATYPIA.     OB / GYN History   Menarche Onset:14  Menopause: Menopause: premenopausal  Hormonal birth control (duration): 4years  Pregnancies: 3  Age at first child birth: 25  Child births: 3  Hysterectomy/Oophorectomy: NA  HRT: no    Family History of Cancer (& age at diagnosis):  Family History   Problem Relation Name Age of Onset    Breast cancer Mother Kayce Bergeron 67    Hypertension Mother Kaycese Bergeron     Thyroid disease Mother Kayce Bergeron     Hypertension Father Aubrey Navarro      Breast cancer Maternal Grandmother Roxi Bergeron 55    Heart failure Maternal Grandfather Chris Bergeron     Breast cancer Maternal Aunt Anita Swartz 67    Rashes / Skin problems Maternal Aunt Anita Swartz     Pancreatic cancer Maternal Aunt Sujatha Swartz Milly 71    Diabetes Maternal Aunt Lillie Bergeron     Stroke Maternal Aunt Lillie Bergeron     Heart failure Paternal Aunt Serafin Ball     Diabetes Paternal Uncle Syd Navarro         Lifestyle:  Height and Weight: 5'3'' & 162 lbs  BMI: Body mass index is 27.28 kg/m².     Other:  # of breast biopsies (when and pathology results): 3  MG breast density: BIRADS C   Prior thoracic RT: none  Genetic testing: Mother tested negative, No paternal family history of Ca. [facility].   Ashkenazi Rastafarian descent: No    Other History:     Past Medical History:   Diagnosis Date    Anemia     When i was 13 or so    Unspecified viral hepatitis B without hepatic coma         Past Surgical History:   Procedure Laterality Date    BREAST BIOPSY           Social History     Socioeconomic History    Marital status:    Tobacco Use    Smoking status: Former     Current packs/day: 1.00     Average packs/day: 1 pack/day for 10.0 years (10.0 ttl pk-yrs)     Types: Cigarettes, Vaping with nicotine    Smokeless tobacco: Never    Tobacco comments:     Quit since 2014. Smoking off and on   Substance and Sexual Activity    Alcohol use: Yes     Alcohol/week: 1.0 standard drink of alcohol     Types: 1 Glasses of wine per week    Drug use: Never    Sexual activity: Yes     Partners: Male     Birth control/protection: None        There is no immunization history on file for this patient.     Medications/Allergies:     Current Outpatient Medications:     dextroamphetamine-amphetamine (ADDERALL XR) 30 MG 24 hr capsule, Take 30 mg by mouth., Disp: , Rfl:     ergocalciferol (ERGOCALCIFEROL) 50,000 unit Cap, Take by mouth., Disp: , Rfl:     diclofenac (VOLTAREN) 75 MG EC tablet, Take 1 tablet (75 mg total) by mouth 2 (two) times daily. (Patient not taking: Reported on 4/18/2023), Disp: 30 tablet, Rfl: 0    Review of patient's allergies indicates:  No Known Allergies     Review of Systems:    Pertinent positives documented in history.     Objective/Physical Exam     Vitals:    07/22/24 0831   BP: (!) 137/97   Pulse:    Resp:    Temp:        General: The patient is awake, alert and oriented times three. The patient is well nourished and in no acute distress.  Neck: There is no evidence of palpable cervical, supraclavicular or axillary adenopathy. The neck is supple. The thyroid is not enlarged.  Musculoskeletal: The patient has a normal range of motion of her bilateral upper extremities.  Chest: Examination of the chest wall fails to reveal any obvious abnormalities. Nonlabored breathing, symmetric expansion.  Breast:  Right:  Examination of right breast fails to reveal any dominant masses or areas of significant focal nodularity. The nipple is everted without evidence of  discharge. There is no skin dimpling with movement of the pectoralis. There are no significant skin changes overlying the breast.   Left:  Examination of the left breast reveals a pea sized subareolar lump in the 3:00 region which is tender to palpation. This lump was shown to be benign fibroglandular tissue on most recent imaging. The nipple is everted without evidence of discharge. There is no skin dimpling with movement of the pectoralis. There are no significant skin changes overlying the breast.  Abdomen: The abdomen is soft, flat, nontender and nondistended.  Integumentary: no rashes or skin lesions present  Neurologic: cranial nerves intact, no signs of peripheral neurological deficit, motor/sensory function intact     Assessment and Plan     There is no problem list on file for this patient.    Rajesh was seen today for follow-up.    Diagnoses and all orders for this visit:    At high risk for breast cancer  -     Mammo Digital Screening Bilat w/ Steven; Future  -     MRI Screening Breast W/WO Contrast, W/CAD, Matt; Future    Family history of breast cancer  -     Mammo Digital Screening Bilat w/ Steven; Future  -     MRI Screening Breast W/WO Contrast, W/CAD, Matt; Future    Screening mammogram for breast cancer  -     Mammo Digital Screening Bilat w/ Steven; Future          PLAN:    1. Lifestyle - Healthy lifestyle guidelines were reviewed. She was encouraged to engage in regular exercise, maintain a healthy body weight, and avoid excessive alcohol consumption. Healthy nutritional guidelines were also discussed. Self-breast examination was reviewed with the patient in detail and she was encouraged to perform this on a monthly basis.    2. Surveillance - She desires undergoing high risk screening with annual screening mammograms and breast MRIs. In the absence of significant clinical findings in the interval, I recommend patient undergo High Risk Breast MRI next available - currently booking out to December 2024,  and will push back annual MG screening to April 2025 to spread imaging out. RTC in 1 year.     3. Genetics - Mother tested negative. No paternal family history. However [facility] genetic [facility]. We will obtain results when made available.     4. Her BP was elevated during today's visit. She is asymptomatic (denies headache, lightheadedness, SOB, or CP). She contributes the BP readings anxiety about today's appointment. She has BP cuff at home and states her blood pressure is not elevated at home. She was advised to follow up with her PCP regarding her elevated blood pressure and report to the ED if she develops headache, lightheadedness, sob, or chest pain.    5. Other - Continue following up with PCP. Continue following with GYN for annual.     All of her questions were answered.     Keiko Gomes PA-C   --------------------------------------------------------------------------------------------------------------  Total time on the date of the visit ranged from 30-39 mins (35369). Total time includes both face-to-face and non-face-to-face time personally spent by myself on the day of the visit.    Non-face-to-face time included:  _X_ preparing to see the patient such as reviewing the patient record  __ obtaining and reviewing separately obtained history  _X_ independently interpreting results  _X_ documenting clinical information in electronic health record.    Face-to-face time included:  _X_ performing an appropriate history and examination  _X_ communicating results to the patient  _X_ counseling and educating the patient  __ ordering appropriate medications  __ ordering appropriate tests  _X_ ordering appropriate procedures (including follow-up)  _X_ answering any questions the patient had    Total Time spent on date of visit: 35 minutes

## 2024-07-22 ENCOUNTER — OFFICE VISIT (OUTPATIENT)
Dept: SURGERY | Facility: CLINIC | Age: 44
End: 2024-07-22
Payer: COMMERCIAL

## 2024-07-22 VITALS
RESPIRATION RATE: 20 BRPM | DIASTOLIC BLOOD PRESSURE: 97 MMHG | HEART RATE: 109 BPM | TEMPERATURE: 98 F | OXYGEN SATURATION: 100 % | HEIGHT: 63 IN | SYSTOLIC BLOOD PRESSURE: 137 MMHG | BODY MASS INDEX: 27.29 KG/M2 | WEIGHT: 154 LBS

## 2024-07-22 DIAGNOSIS — Z91.89 AT HIGH RISK FOR BREAST CANCER: Primary | ICD-10-CM

## 2024-07-22 DIAGNOSIS — Z80.3 FAMILY HISTORY OF BREAST CANCER: ICD-10-CM

## 2024-07-22 DIAGNOSIS — Z12.31 SCREENING MAMMOGRAM FOR BREAST CANCER: ICD-10-CM

## 2024-07-22 PROCEDURE — 99999 PR PBB SHADOW E&M-EST. PATIENT-LVL IV: CPT | Mod: PBBFAC,,,

## 2024-07-22 PROCEDURE — 99214 OFFICE O/P EST MOD 30 MIN: CPT | Mod: S$GLB,,,

## 2024-07-22 RX ORDER — ERGOCALCIFEROL 1.25 MG/1
CAPSULE ORAL
COMMUNITY
Start: 2024-03-08

## 2024-12-10 ENCOUNTER — APPOINTMENT (OUTPATIENT)
Dept: RADIOLOGY | Facility: HOSPITAL | Age: 44
End: 2024-12-10
Attending: INTERNAL MEDICINE
Payer: OTHER GOVERNMENT

## 2024-12-10 DIAGNOSIS — Z12.31 ENCOUNTER FOR SCREENING MAMMOGRAM FOR MALIGNANT NEOPLASM OF BREAST: ICD-10-CM

## 2024-12-10 PROCEDURE — 77049 MRI BREAST C-+ W/CAD BI: CPT | Mod: 26,,, | Performed by: STUDENT IN AN ORGANIZED HEALTH CARE EDUCATION/TRAINING PROGRAM

## 2024-12-10 PROCEDURE — 25500020 PHARM REV CODE 255

## 2024-12-10 PROCEDURE — A9577 INJ MULTIHANCE: HCPCS

## 2024-12-10 PROCEDURE — 77049 MRI BREAST C-+ W/CAD BI: CPT | Mod: TC

## 2024-12-10 RX ADMIN — GADOBENATE DIMEGLUMINE 15 ML: 529 INJECTION, SOLUTION INTRAVENOUS at 10:12

## 2025-04-01 ENCOUNTER — HOSPITAL ENCOUNTER (OUTPATIENT)
Dept: RADIOLOGY | Facility: HOSPITAL | Age: 45
Discharge: HOME | End: 2025-04-01
Payer: OTHER GOVERNMENT

## 2025-04-01 DIAGNOSIS — Z12.31 SCREENING MAMMOGRAM FOR BREAST CANCER: ICD-10-CM

## 2025-04-01 DIAGNOSIS — Z80.3 FAMILY HISTORY OF BREAST CANCER: ICD-10-CM

## 2025-04-01 DIAGNOSIS — Z91.89 AT HIGH RISK FOR BREAST CANCER: ICD-10-CM

## 2025-04-01 PROCEDURE — 77067 SCR MAMMO BI INCL CAD: CPT | Mod: 26,,, | Performed by: RADIOLOGY

## 2025-04-01 PROCEDURE — 77067 SCR MAMMO BI INCL CAD: CPT | Mod: TC

## 2025-04-01 PROCEDURE — 77063 BREAST TOMOSYNTHESIS BI: CPT | Mod: 26,,, | Performed by: RADIOLOGY

## 2025-04-02 ENCOUNTER — RESULTS FOLLOW-UP (OUTPATIENT)
Dept: SURGERY | Facility: CLINIC | Age: 45
End: 2025-04-02

## 2025-07-22 NOTE — PROGRESS NOTES
[facility] - [facility]  Breast Surgical Oncology  Follow up Visit - H&P      Referring Provider:  Keiko Gomes     PCP: Networked reference to record PCT      Care Team: Patient Care Team:  Vitaly Renae MD as PCP - General (Obstetrics and Gynecology)  Keiko Gomes PA-C as Physician Assistant (General Surgery)  Rachell Blackwood MD (Hospitalist)    Chief Complaint:   Chief Complaint   Patient presents with    Follow-up     Patient denies any breast related issues , concerns or breast changes on today       Subjective:      Interval History: 2025 Patient here for high risk follow up. She is doing well today. She recently underwent breast MRI in December and screening mammogram in April which both resulted as benign. She currently denies any other breast issues including rashes, redness, swelling, or nipple discharge. She performs self breast exams monthly. She denies any significant interval changes or any other changes to family history. She has no other questions or concerns today.      History of Present Illness:  Rajesh Navarro  is a pleasant female patient who initially presents on  2025 at 44 y.o. years old for evaluation and assessment of risk for breast cancer based on  the Tyrer - Cuzick Breast Cancer Risk Model (> 20% indicates elevated lifetime risk). Her lifetime risk is calculated to be TC V8 44.4%. Kathy 5 year 3.1% (updated 2024). Mother diagnosed with Breast CA at age 66. Genetic testing for mother returned negative. No paternal family history of cancer.     [facility] is a . [facility]. Denies any breast issues including rashes, redness, pain, swelling, nipple discharge, or new lumps/masses. Performs self breast exams monthly.    Imagin23 BL Diagnostic MG with US: Breast Heterogeneously dense. Asymmetry in the lateral left breast, middle depth, measuring up to 40 mm is suspicious.  Left breast tomosynthesis  guided core needle biopsy is recommended. Amorphous calcifications in a regional distribution spanning 24 mm in the upper-outer quadrant of the right breast, middle to posterior depth, are suspicious.  Right breast stereotactic guided core needle biopsy is recommended. Oval, hypoechoic mass with indistinct margins measuring up to 6 mm in the 9:00 right breast, 4 cm from the nipple, is suspicious.  Right breast ultrasound-guided core needle biopsy is recommended.      02/1/23 Tomosynthesis/stereotactic guided biopsy of the asymmetry in the lateral left breast, middle depth, with placement of a clip was successful with no apparent post procedure complications.  Pathology indicates focal sclerosing adenosis. Pathology results are benign and are concordant with the imaging findings.      6/21/2023 Breast MRI: No MRI evidence of malignancy in either breast. No abnormal enhancement at the benign core needle biopsy sites in both breasts. Cystic mastopathy of both breasts is benign.  BI-RADS 2: Benign.      3/11//2024 DG MG and L US - No mammographic evidence of malignancy is seen involving either breast.  No sonographic evidence of malignancy is seen involving the 3:00 subareolar left breast.No suspicious mammographic or sonographic finding in the 3:00 subareolar left breast, in the area of palpable concern.  Benign fibroglandular tissue is seen in this region.  The patient was reassured and encouraged to follow up with her primary care provider.    12/16/2024 Breast MRI - No MRI evidence of malignancy in either breast. BI-RADS: 2 Benign   4/1/2025 SC MG - There is no mammographic evidence of malignancy. BI-RADS: 2 Benign     Pathology:   02/01/23 1. RIGHT BREAST AT 9 O'CLOCK 4 CMFN, BIOPSIES:   FRAGMENT OF REACTIVE LYMPH NODE. NO NEOPLASM.   2. RIGHT BREAST, UPPER OUTER QUADRANT MID DEPTH AMORPHOUS CALCIFICATIONS,   BIOPSIES:     A) FIBROCYSTIC CHANGE WITH CALCIFICATION.     B) FIBROADENOMATOID CHANGE.     C) EPITHELIAL  HYPERPLASIA OF THE USUAL TYPE, MILD.     D) FOCAL PSEUDOANGIOMATOUS STROMAL HYPERPLASIA.     E) FOCAL SCLEROSING ADENOSIS.   3. LEFT BREAST, LATERAL MID DEPTH ASYMMETRY, BIOPSIES:     FOCAL SCLEROSING ADENOSIS. NO ATYPIA.     OB / GYN History   Menarche Onset:14  Menopause: Menopause: premenopausal  Hormonal birth control (duration): 4years  Pregnancies: 3  Age at first child birth: 25  Child births: 3  Hysterectomy/Oophorectomy: NA  HRT: no    Family History of Cancer (& age at diagnosis):  Family History   Problem Relation Name Age of Onset    Breast cancer Mother Kaycese Bergeron 67    Hypertension Mother Kaycese Bergeron     Thyroid disease Mother Kaycese Bergeron     Hypertension Father Aubrey Navarro .     Breast cancer Maternal Grandmother Roxi Bergeron 55    Heart failure Maternal Grandfather Chris Bergeron     Breast cancer Maternal Aunt Anita Swartz 67    Rashes / Skin problems Maternal Aunt Anita Swartz     Pancreatic cancer Maternal Aunt Sujatha Swartz Milly 71    Diabetes Maternal Aunt Lillie Bergeron     Stroke Maternal Aunt Lillie Bergeron     Heart failure Paternal Aunt Serafin Ball     Diabetes Paternal Uncle Syd Navarro     Rheum arthritis Brother Alessandro Navarro         Lifestyle:  Height and Weight: 5'3'' & 162 lbs  BMI: Body mass index is 24.91 kg/m².     Other:  # of breast biopsies (when and pathology results): 3  MG breast density: BIRADS C   Prior thoracic RT: none  Genetic testing: Mother tested negative, No paternal family history of Ca. [facility].   Ashkenazi Christianity descent: No    Other History:     Past Medical History:   Diagnosis Date    Anemia     When i was 13 or so    Unspecified viral hepatitis B without hepatic coma         Past Surgical History:   Procedure Laterality Date    BREAST BIOPSY          Social History     Socioeconomic History    Marital status:    Tobacco Use    Smoking status: Former     Current packs/day: 1.00     Average  packs/day: 1 pack/day for 10.0 years (10.0 ttl pk-yrs)     Types: Cigarettes    Smokeless tobacco: Never    Tobacco comments:     Quit since 2014. Smoking off and on   Substance and Sexual Activity    Alcohol use: Yes     Alcohol/week: 1.0 standard drink of alcohol     Types: 1 Glasses of wine per week    Drug use: Never    Sexual activity: Not Currently     Partners: Male     Birth control/protection: Abstinence, None        There is no immunization history on file for this patient.     Medications/Allergies:     Current Outpatient Medications:     baclofen (LIORESAL) 10 MG tablet, , Disp: , Rfl:     dextroamphetamine-amphetamine (ADDERALL XR) 30 MG 24 hr capsule, Take 30 mg by mouth., Disp: , Rfl:     ergocalciferol (ERGOCALCIFEROL) 50,000 unit Cap, Take by mouth., Disp: , Rfl:     HYDROcodone-acetaminophen (NORCO)  mg per tablet, , Disp: , Rfl:     diclofenac (VOLTAREN) 75 MG EC tablet, Take 1 tablet (75 mg total) by mouth 2 (two) times daily., Disp: 30 tablet, Rfl: 0    Review of patient's allergies indicates:  No Known Allergies     Review of Systems:    Pertinent positives documented in history.     Objective/Physical Exam     Vitals:    07/23/25 0845   Pulse: 86   Resp: 18   Temp: 98.5 °F (36.9 °C)         General: The patient is awake, alert and oriented times three. The patient is well nourished and in no acute distress.  Neck: There is no evidence of palpable cervical, supraclavicular or axillary adenopathy. The neck is supple. The thyroid is not enlarged.  Musculoskeletal: The patient has a normal range of motion of her bilateral upper extremities.  Chest: Examination of the chest wall fails to reveal any obvious abnormalities. Nonlabored breathing, symmetric expansion.  Breast:  Right: Examination of right breast fails to reveal any dominant masses or areas of significant focal nodularity. The nipple is everted without evidence of discharge. There is no skin dimpling with movement of the  pectoralis. There are no significant skin changes overlying the breast.   Left:   Examination of left breast fails to reveal any dominant masses or areas of significant focal nodularity.The nipple is everted without evidence of discharge. There is no skin dimpling with movement of the pectoralis. There are no significant skin changes overlying the breast.  Abdomen: The abdomen is soft, flat, nontender and nondistended.  Integumentary: no rashes or skin lesions present  Neurologic: cranial nerves intact, no signs of peripheral neurological deficit, motor/sensory function intact     Assessment and Plan     There is no problem list on file for this patient.    Rajesh was seen today for follow-up.    Diagnoses and all orders for this visit:    At high risk for breast cancer  -     Mammo Digital Screening Bilat w/ Steven (XPD); Future  -     MRI Screening Breast W/WO Contrast, W/CAD, Matt; Future    Family history of breast cancer  -     Mammo Digital Screening Bilat w/ Steven (XPD); Future  -     MRI Screening Breast W/WO Contrast, W/CAD, Matt; Future    Screening mammogram for breast cancer  -     Mammo Digital Screening Bilat w/ Steven (XPD); Future        PLAN:    1. Lifestyle - Healthy lifestyle guidelines were reviewed. She was encouraged to engage in regular exercise, maintain a healthy body weight, and avoid excessive alcohol consumption. Healthy nutritional guidelines were also discussed. Self-breast examination was reviewed with the patient in detail and she was encouraged to perform this on a monthly basis.    2. Surveillance - She desires undergoing high risk screening with annual screening mammograms and breast MRIs. In the absence of significant clinical findings in the interval, I recommend patient undergo High Risk Breast MRI in December 2025, and will push back annual MG screening to June 2026 to spread imaging out. RTC in 1 year.     3. Genetics - Mother tested negative. No paternal family history. However [facility] genetic [facility]. We will obtain results when made available.     4. Other - Continue following up with PCP. Continue following with GYN for annual.     All of her questions were answered.     Keiko Gomes PA-C   --------------------------------------------------------------------------------------------------------------  Total time on the date of the visit ranged from 30-39 mins (04961). Total time includes both face-to-face and non-face-to-face time personally spent by myself on the day of the visit.    Non-face-to-face time included:  _X_ preparing to see the patient such as reviewing the patient record  __ obtaining and reviewing separately obtained history  _X_ independently interpreting results  _X_ documenting clinical information in electronic health record.    Face-to-face time included:  _X_ performing an appropriate history and examination  _X_ communicating results to the patient  _X_ counseling and educating the patient  __ ordering appropriate medications  __ ordering appropriate tests  _X_ ordering appropriate procedures (including follow-up)  _X_ answering any questions the patient had    Total Time spent on date of visit: 32 minutes

## 2025-07-23 ENCOUNTER — OFFICE VISIT (OUTPATIENT)
Dept: SURGERY | Facility: CLINIC | Age: 45
End: 2025-07-23
Payer: OTHER GOVERNMENT

## 2025-07-23 VITALS
HEIGHT: 63 IN | RESPIRATION RATE: 18 BRPM | OXYGEN SATURATION: 100 % | HEART RATE: 86 BPM | TEMPERATURE: 99 F | BODY MASS INDEX: 24.92 KG/M2 | WEIGHT: 140.63 LBS

## 2025-07-23 DIAGNOSIS — Z12.31 SCREENING MAMMOGRAM FOR BREAST CANCER: ICD-10-CM

## 2025-07-23 DIAGNOSIS — Z91.89 AT HIGH RISK FOR BREAST CANCER: Primary | ICD-10-CM

## 2025-07-23 DIAGNOSIS — Z80.3 FAMILY HISTORY OF BREAST CANCER: ICD-10-CM

## 2025-07-23 PROCEDURE — 99214 OFFICE O/P EST MOD 30 MIN: CPT | Mod: S$PBB,,,

## 2025-07-23 PROCEDURE — 99999 PR PBB SHADOW E&M-EST. PATIENT-LVL IV: CPT | Mod: PBBFAC,,,

## 2025-07-23 PROCEDURE — 99214 OFFICE O/P EST MOD 30 MIN: CPT | Mod: PBBFAC

## 2025-07-23 RX ORDER — HYDROCODONE BITARTRATE AND ACETAMINOPHEN 10; 325 MG/1; MG/1
TABLET ORAL
COMMUNITY
Start: 2025-04-08

## 2025-07-23 RX ORDER — BACLOFEN 10 MG/1
TABLET ORAL
COMMUNITY
Start: 2025-05-22